# Patient Record
(demographics unavailable — no encounter records)

---

## 2024-10-21 NOTE — ASSESSMENT
[FreeTextEntry1] : 61-year-old man with hypertension due to primary aldosteronism as well as CKD stage III.  His BP is beautifully controlled with a BP today of 108/72.  He will return in 5 to 6 months preceded by labs to include BMP, Cystatin C, aldosterone level, plasma renin activity, A1c, urine microalbumin.  He will follow-up with his PCP, Dr. Surendra Mata.

## 2024-10-21 NOTE — CONSULT LETTER
[Dear  ___] : Dear  [unfilled], [Consult Letter:] : I had the pleasure of evaluating your patient, [unfilled]. [Please see my note below.] : Please see my note below. [Consult Closing:] : Thank you very much for allowing me to participate in the care of this patient.  If you have any questions, please do not hesitate to contact me. [Sincerely,] : Sincerely, [FreeTextEntry2] : Dr Surendra Mata [FreeTextEntry3] : Sincerely,   Sushil Lopez MD, FACP

## 2024-10-21 NOTE — PHYSICAL EXAM
[General Appearance - Alert] : alert [General Appearance - In No Acute Distress] : in no acute distress [Sclera] : the sclera and conjunctiva were normal [Outer Ear] : the ears and nose were normal in appearance [Neck Appearance] : the appearance of the neck was normal [Skin Color & Pigmentation] : normal skin color and pigmentation [Skin Turgor] : normal skin turgor [] : no rash [Deep Tendon Reflexes (DTR)] : deep tendon reflexes were 2+ and symmetric [Sensation] : the sensory exam was normal to light touch and pinprick [No Focal Deficits] : no focal deficits [Oriented To Time, Place, And Person] : oriented to person, place, and time [Impaired Insight] : insight and judgment were intact [Affect] : the affect was normal

## 2024-10-21 NOTE — HISTORY OF PRESENT ILLNESS
[FreeTextEntry1] : 61-year-old man with a long history of uncontrolled hypertension due primarily to primary aldosteronism.  When he stopped all of his medications his BP blessing to 180/110.  I put him back on amlodipine 10 mg daily, Edarbi 80 mg daily, and spironolactone 100 mg daily.  His BP normalized and it was 132/86 6 months ago when I saw him.  His creatinine has been in the 1.6 range, and his K came up to 4.9 from a low of 3.1, with a GFR of 48.

## 2025-02-25 NOTE — ASSESSMENT
[FreeTextEntry1] : 61-year-old man with well-controlled hypertension that is due to primary aldosteronism.  His plasma renin activity is nicely elevated at 5, which is felt to be protective.  He is due for labs and I am sending him today for aldosterone level, PRA, BMP, Cystatin C, PTH, urine microalbumin, A1c.  He will return in 4 months

## 2025-02-25 NOTE — HISTORY OF PRESENT ILLNESS
[FreeTextEntry1] : 61-year-old man with a long history of uncontrolled hypertension that turns out to be due to primary aldosteronism.  His BP does well when he takes his medications faithfully.  He has been on amlodipine 10 mg daily, Edarbi 80 mg daily, and spironolactone 100 mg daily.  His BP here was 108/72 in October 2024.  However, when he stopped all of his medications in the past.  His BP skyrocketed to about 180/110.  His renal function is compromised, with a creatinine of 2.0, BUN 25, GFR 37, K5.0, CO2 23, GFR 42 by Cystatin C, urine microalbumin of 68, A1c of 7.0, PRA of 5 and aldosterone level of 62 back in October.  He is due for labs again.

## 2025-03-31 NOTE — HISTORY OF PRESENT ILLNESS
[FreeTextEntry1] : 61-year-old man with a long history of uncontrolled hypertension, which turns out to be due to primary aldosteronism.  He has not been interested in extensive evaluation and we are treating him medically.  His regimen consists of spironolactone 100 mg daily, amlodipine 10 mg daily, and Edarbi 80 mg daily.  His BP is controlled, with a reading today of 126/82.  His renal function has improved slightly, with creatinine falling from 2.0 down to 1.79, BUN 30, GFR 43, PTH 86.  His aldosterone level is highly elevated at 75, but his PRA is 7.5, which is desirable.  His K is 5.1 and CO2 25.  He feels well with no complaints.

## 2025-03-31 NOTE — ASSESSMENT
[FreeTextEntry1] : 61-year-old male with controlled hypertension, stable renal function, who will return in 4 months preceded by labs.